# Patient Record
Sex: MALE | Race: BLACK OR AFRICAN AMERICAN | Employment: FULL TIME | URBAN - METROPOLITAN AREA
[De-identification: names, ages, dates, MRNs, and addresses within clinical notes are randomized per-mention and may not be internally consistent; named-entity substitution may affect disease eponyms.]

---

## 2017-07-24 PROBLEM — A63.0 GENITAL WARTS: Status: ACTIVE | Noted: 2017-07-24

## 2018-06-01 ENCOUNTER — OFFICE VISIT (OUTPATIENT)
Dept: FAMILY MEDICINE CLINIC | Age: 33
End: 2018-06-01

## 2018-06-01 ENCOUNTER — HOSPITAL ENCOUNTER (OUTPATIENT)
Dept: LAB | Age: 33
Discharge: HOME | End: 2018-06-01
Payer: COMMERCIAL

## 2018-06-01 VITALS
HEART RATE: 73 BPM | RESPIRATION RATE: 17 BRPM | BODY MASS INDEX: 32.7 KG/M2 | HEIGHT: 75 IN | DIASTOLIC BLOOD PRESSURE: 75 MMHG | TEMPERATURE: 96.9 F | SYSTOLIC BLOOD PRESSURE: 129 MMHG | WEIGHT: 263 LBS

## 2018-06-01 DIAGNOSIS — R74.01 TRANSAMINITIS: ICD-10-CM

## 2018-06-01 DIAGNOSIS — E78.2 MIXED HYPERLIPIDEMIA: ICD-10-CM

## 2018-06-01 DIAGNOSIS — L30.9 DERMATITIS: ICD-10-CM

## 2018-06-01 DIAGNOSIS — R79.89 LOW SERUM TESTOSTERONE LEVEL: ICD-10-CM

## 2018-06-01 DIAGNOSIS — E66.9 OBESITY (BMI 30-39.9): ICD-10-CM

## 2018-06-01 DIAGNOSIS — R74.01 TRANSAMINITIS: Primary | ICD-10-CM

## 2018-06-01 LAB
ALBUMIN SERPL-MCNC: 4.1 G/DL (ref 3.4–5)
ALBUMIN/GLOB SERPL: 1.3 {RATIO} (ref 0.8–1.7)
ALP SERPL-CCNC: 99 U/L (ref 45–117)
ALT SERPL-CCNC: 227 U/L (ref 16–61)
ANION GAP SERPL CALC-SCNC: 9 MMOL/L (ref 3–18)
APPEARANCE UR: CLEAR
AST SERPL-CCNC: 106 U/L (ref 15–37)
BILIRUB SERPL-MCNC: 0.7 MG/DL (ref 0.2–1)
BILIRUB UR QL: NEGATIVE
BUN SERPL-MCNC: 13 MG/DL (ref 7–18)
BUN/CREAT SERPL: 13 (ref 12–20)
CALCIUM SERPL-MCNC: 8.9 MG/DL (ref 8.5–10.1)
CHLORIDE SERPL-SCNC: 109 MMOL/L (ref 100–108)
CHOLEST SERPL-MCNC: 223 MG/DL
CO2 SERPL-SCNC: 25 MMOL/L (ref 21–32)
COLOR UR: NORMAL
CREAT SERPL-MCNC: 1.02 MG/DL (ref 0.6–1.3)
ERYTHROCYTE [DISTWIDTH] IN BLOOD BY AUTOMATED COUNT: 13.8 % (ref 11.6–14.5)
FERRITIN SERPL-MCNC: 416 NG/ML (ref 8–388)
GLOBULIN SER CALC-MCNC: 3.2 G/DL (ref 2–4)
GLUCOSE SERPL-MCNC: 93 MG/DL (ref 74–99)
GLUCOSE UR STRIP.AUTO-MCNC: NEGATIVE MG/DL
HCT VFR BLD AUTO: 48.5 % (ref 36–48)
HDLC SERPL-MCNC: 42 MG/DL (ref 40–60)
HDLC SERPL: 5.3 {RATIO} (ref 0–5)
HGB BLD-MCNC: 16.2 G/DL (ref 13–16)
HGB UR QL STRIP: NEGATIVE
KETONES UR QL STRIP.AUTO: NEGATIVE MG/DL
LDLC SERPL CALC-MCNC: 153.8 MG/DL (ref 0–100)
LEUKOCYTE ESTERASE UR QL STRIP.AUTO: NEGATIVE
LIPID PROFILE,FLP: ABNORMAL
MCH RBC QN AUTO: 30.4 PG (ref 24–34)
MCHC RBC AUTO-ENTMCNC: 33.4 G/DL (ref 31–37)
MCV RBC AUTO: 91 FL (ref 74–97)
NITRITE UR QL STRIP.AUTO: NEGATIVE
PH UR STRIP: 5.5 [PH] (ref 5–8)
PLATELET # BLD AUTO: 178 K/UL (ref 135–420)
PMV BLD AUTO: 12.1 FL (ref 9.2–11.8)
POTASSIUM SERPL-SCNC: 4.3 MMOL/L (ref 3.5–5.5)
PROT SERPL-MCNC: 7.3 G/DL (ref 6.4–8.2)
PROT UR STRIP-MCNC: NEGATIVE MG/DL
RBC # BLD AUTO: 5.33 M/UL (ref 4.7–5.5)
SODIUM SERPL-SCNC: 143 MMOL/L (ref 136–145)
SP GR UR REFRACTOMETRY: 1.03 (ref 1–1.03)
TRIGL SERPL-MCNC: 136 MG/DL (ref ?–150)
UROBILINOGEN UR QL STRIP.AUTO: 1 EU/DL (ref 0.2–1)
VLDLC SERPL CALC-MCNC: 27.2 MG/DL
WBC # BLD AUTO: 6.3 K/UL (ref 4.6–13.2)

## 2018-06-01 PROCEDURE — 86803 HEPATITIS C AB TEST: CPT | Performed by: INTERNAL MEDICINE

## 2018-06-01 PROCEDURE — 82103 ALPHA-1-ANTITRYPSIN TOTAL: CPT | Performed by: INTERNAL MEDICINE

## 2018-06-01 PROCEDURE — 82390 ASSAY OF CERULOPLASMIN: CPT | Performed by: INTERNAL MEDICINE

## 2018-06-01 PROCEDURE — 86038 ANTINUCLEAR ANTIBODIES: CPT | Performed by: INTERNAL MEDICINE

## 2018-06-01 PROCEDURE — 80061 LIPID PANEL: CPT | Performed by: INTERNAL MEDICINE

## 2018-06-01 PROCEDURE — 87340 HEPATITIS B SURFACE AG IA: CPT | Performed by: INTERNAL MEDICINE

## 2018-06-01 PROCEDURE — 85027 COMPLETE CBC AUTOMATED: CPT | Performed by: INTERNAL MEDICINE

## 2018-06-01 PROCEDURE — 84403 ASSAY OF TOTAL TESTOSTERONE: CPT | Performed by: INTERNAL MEDICINE

## 2018-06-01 PROCEDURE — 81003 URINALYSIS AUTO W/O SCOPE: CPT | Performed by: INTERNAL MEDICINE

## 2018-06-01 PROCEDURE — 82728 ASSAY OF FERRITIN: CPT | Performed by: INTERNAL MEDICINE

## 2018-06-01 PROCEDURE — 36415 COLL VENOUS BLD VENIPUNCTURE: CPT | Performed by: INTERNAL MEDICINE

## 2018-06-01 PROCEDURE — 80053 COMPREHEN METABOLIC PANEL: CPT | Performed by: INTERNAL MEDICINE

## 2018-06-01 RX ORDER — TRIAMCINOLONE ACETONIDE 1 MG/G
CREAM TOPICAL 2 TIMES DAILY
Qty: 60 G | Refills: 1 | Status: SHIPPED | OUTPATIENT
Start: 2018-06-01

## 2018-06-01 NOTE — MR AVS SNAPSHOT
Elle Moscoso [place] [address] [facility] [place]. [address] [facility] 83 [id number] 
[phone] Patient: Keely Arita MRN: [id number] :1985 Visit Information Date & Time Provider Department Dept. Phone Encounter #  
 2018  8:30 AM Shama Hahn, [address] [place] [address] [phone] [id number] Follow-up Instructions Return in about 2 weeks (around 6/15/2018) for 30 minute slot if possible. Zhao Linda Upcoming Health Maintenance Date Due Pneumococcal 19-64 Medium Risk (1 of 1 - PPSV23) 2005 DTaP/Tdap/Td series (1 - Tdap) 2007 Influenza Age 5 to Adult 2018 Allergies as of 2018  Review Complete On: 2018 By: Shama Hahn MD  
  
 Severity Noted Reaction Type Reactions Penicillins Low 2014   Not Verified Unknown (comments) Pt states \" not sure has never taken it but was told he was allergic to it\" Current Immunizations  Reviewed on 10/26/2016 No immunizations on file. Not reviewed this visit You Were Diagnosed With   
  
 Codes Comments Transaminitis    -  Primary ICD-10-CM: R74.0 ICD-9-CM: 790.4 Mixed hyperlipidemia     ICD-10-CM: E78.2 ICD-9-CM: 272.2 Low serum testosterone level     ICD-10-CM: R79.89 ICD-9-CM: 790.99 Dermatitis     ICD-10-CM: L30.9 ICD-9-CM: 692.9 Obesity (BMI 30-39. 9)     ICD-10-CM: E66.9 ICD-9-CM: 278.00 Vitals BP Pulse Temp Resp Height(growth percentile) Weight(growth percentile) 129/75 73 96.9 °F (36.1 °C) (Oral) 17 6' 3\" (1.905 m) 263 lb (119.3 kg) BMI Smoking Status 32.87 kg/m2 Current Every Day Smoker Vitals History BMI and BSA Data Body Mass Index Body Surface Area  
 32.87 kg/m 2 2.51 m 2 Preferred Pharmacy Pharmacy Name Phone [facility] [address] [phone] Your Updated Medication List  
  
   
This list is accurate as of 6/1/18  9:10 AM.  Always use your most recent med list.  
  
  
  
  
 triamcinolone acetonide 0.1 % topical cream  
Commonly known as:  KENALOG Apply  to affected area two (2) times a day. use thin layer for 10-14 days. Prescriptions Sent to Pharmacy Refills  
 triamcinolone acetonide (KENALOG) 0.1 % topical cream 1 Sig: Apply  to affected area two (2) times a day. use thin layer for 10-14 days. Class: Normal  
 Pharmacy: [facility] [address], [address] Ph #: [phone] Route: Topical  
  
Follow-up Instructions Return in about 2 weeks (around 6/15/2018) for 30 minute slot if possible. Tory Shell To-Do List   
 06/01/2018 Lab:  ALPHA-1-ANTITRYPSIN, TOTAL   
  
 06/01/2018 Lab:  KATERIN QL, W/REFLEX CASCADE   
  
 06/01/2018 Lab:  CBC W/O DIFF   
  
 06/01/2018 Lab:  CERULOPLASMIN   
  
 06/01/2018 Lab:  FERRITIN   
  
 06/01/2018 Lab:  HEP B SURFACE AG   
  
 06/01/2018 Lab:  HEPATITIS C AB   
  
 06/01/2018 Lab:  LIPID PANEL   
  
 06/01/2018 Lab:  METABOLIC PANEL, COMPREHENSIVE   
  
 06/01/2018 Lab:  TESTOSTERONE, FREE & TOTAL   
  
 06/01/2018 Lab:  URINALYSIS W/ RFLX MICROSCOPIC   
  
 06/01/2018 Imaging:  [facility] Patient Instructions Body Mass Index: Care Instructions Your Care Instructions Body mass index (BMI) can help you see if your weight is raising your risk for health problems. It uses a formula to compare how much you weigh with how tall you are. · A BMI lower than 18.5 is considered underweight. · A BMI between 18.5 and 24.9 is considered healthy. · A BMI between 25 and 29.9 is considered overweight. A BMI of 30 or higher is considered obese.  
If your BMI is in the normal range, it means that you have a lower risk for weight-related health problems. If your BMI is in the overweight or obese range, you may be at increased risk for weight-related health problems, such as high blood pressure, heart disease, stroke, arthritis or joint pain, and diabetes. If your BMI is in the underweight range, you may be at increased risk for health problems such as fatigue, lower protection (immunity) against illness, muscle loss, bone loss, hair loss, and hormone problems. BMI is just one measure of your risk for weight-related health problems. You may be at higher risk for health problems if you are not active, you eat an unhealthy diet, or you drink too much alcohol or use tobacco products. Follow-up care is a key part of your treatment and safety. Be sure to make and go to all appointments, and call your doctor if you are having problems. It's also a good idea to know your test results and keep a list of the medicines you take. How can you care for yourself at home? · Practice healthy eating habits. This includes eating plenty of fruits, vegetables, whole grains, lean protein, and low-fat dairy. · If your doctor recommends it, get more exercise. Walking is a good choice. Bit by bit, increase the amount you walk every day. Try for at least 30 minutes on most days of the week. · Do not smoke. Smoking can increase your risk for health problems. If you need help quitting, talk to your doctor about stop-smoking programs and medicines. These can increase your chances of quitting for good. · Limit alcohol to 2 drinks a day for men and 1 drink a day for women. Too much alcohol can cause health problems. If you have a BMI higher than 25 · Your doctor may do other tests to check your risk for weight-related health problems. This may include measuring the distance around your waist. A waist measurement of more than 40 inches in men or 35 inches in women can increase the risk of weight-related health problems. · Talk with your doctor about steps you can take to stay healthy or improve your health. You may need to make lifestyle changes to lose weight and stay healthy, such as changing your diet and getting regular exercise. If you have a BMI lower than 18.5 · Your doctor may do other tests to check your risk for health problems. · Talk with your doctor about steps you can take to stay healthy or improve your health. You may need to make lifestyle changes to gain or maintain weight and stay healthy, such as getting more healthy foods in your diet and doing exercises to build muscle. Where can you learn more? Go to http://destinee-hyacinth.info/. Enter S176 in the search box to learn more about \"Body Mass Index: Care Instructions. \" Current as of: October 13, 2016 Content Version: 11.4 © 0539-0586 [employer]. Care instructions adapted under license by [employer] (which disclaims liability or warranty for this information). If you have questions about a medical condition or this instruction, always ask your healthcare professional. [address] any warranty or liability for your use of this information. Introducing [facility]! Becky Yoder introduces [employer] patient portal. Now you can access parts of your medical record, email your doctor's office, and request medication refills online. 1. In your internet browser, go to [email or web address] 2. Click on the First Time User? Click Here link in the Sign In box. You will see the New Member Sign Up page. 3. Enter your [employer] Access Code exactly as it appears below. You will not need to use this code after youve completed the sign-up process. If you do not sign up before the expiration date, you must request a new code. · [employer] Access Code: [email or web address] Expires: 8/30/2018  8:38 AM 
 
4.  Enter the last four digits of your Social Security Number (xxxx) and Date of Birth (mm/dd/yyyy) as indicated and click Submit. You will be taken to the next sign-up page. 5. Create a Cloudsnap ID. This will be your Clouds[employer] login ID and cannot be changed, so think of one that is secure and easy to remember. 6. Create a [employer] password. You can change your password at any time. 7. Enter your Password Reset Question and Answer. This can be used at a later time if you forget your password. 8. Enter your e-mail address. You will receive e-mail notification when new information is available in [address] 9. Click Sign Up. You can now view and download portions of your medical record. 10. Click the Download Summary menu link to download a portable copy of your medical information. If you have questions, please visit the Frequently Asked Questions section of the [employer] website. Remember, [employer] is NOT to be used for urgent needs. For medical emergencies, dial 911. Now available from your iPhone and Android! Please provide this summary of care documentation to your next provider. Your primary care clinician is listed as MARION Downs. If you have any questions after today's visit, please call [phone].

## 2018-06-01 NOTE — PROGRESS NOTES
Kymberly Aviles is a 33 y.o.  male and presents with Complete Physical; Rash (itchy rash on his left lower leg); and Request For New Medication (to quit smoking)       Subjective:    Left leg rash - \"long time\"- about a years- itchy- scaling. Low testerone level- would like this rechecked. transaminitis- no IVDA hx. No h/o hep B or C. Drinks heavily about 2 days per week. No jaundice. No abd pain. Hyperlipidemia- reports avoiding fried foods now. Obesity- has lost some weight by watching diet. Diagnoses and all orders for this visit:    1. Transaminitis  -     US ABD LTD; Future  -     CERULOPLASMIN; Future  -     ALPHA-1-ANTITRYPSIN, TOTAL; Future  -     FERRITIN; Future  -     HEP B SURFACE AG; Future  -     HEPATITIS C AB; Future  -     KATERIN QL, W/REFLEX CASCADE; Future  -     CBC W/O DIFF; Future    2. Mixed hyperlipidemia  -     METABOLIC PANEL, COMPREHENSIVE; Future  -     LIPID PANEL; Future  -     URINALYSIS W/ RFLX MICROSCOPIC; Future    3. Low serum testosterone level  -     TESTOSTERONE, FREE & TOTAL; Future    4. Dermatitis  -     METABOLIC PANEL, COMPREHENSIVE; Future  -     CBC W/O DIFF; Future    5. Obesity (BMI 30-39. 9)-encouraged weight loss 1 pound per week to BMI under 25 through caloric restriction and exercise. Other orders  -     triamcinolone acetonide (KENALOG) 0.1 % topical cream; Apply  to affected area two (2) times a day. use thin layer for 10-14 days. ROS:  Constitutional: No recent weight change. No weakness/fatigue. No f/c. Skin: + rash as above, change in nails/hair, itching   HENT: No HA, dizziness. No hearing loss/tinnitus. No nasal congestion/discharge. Eyes: No change in vision, double/blurred vision or eye pain/redness. Cardiovascular: No CP/palpitations. No LANGSTON/orthopnea/PND. Respiratory: No cough/sputum, dyspnea, wheezing. Gastointestinal: No dysphagia, reflux. No n/v. No constipation/diarrhea. No melena/rectal bleeding.    Genitourinary: No dysuria, urinary hesitancy, nocturia, hematuria. No incontinence. Musculoskeletal: No joint pain/stiffness. No muscle pain/tenderness. Endo: No heat/cold intolerance, no polyuria/polydypsia. Heme: No h/o anemia. No easy bleeding/bruising. Allergy/Immunology: No seasonal rhinitis. Denies frequent colds, sinus/ear infections. Neurological: No seizures/numbness/weakness. No paresthesias. Psychiatric:  No depression, anxiety. PMH:  Past Medical History:   Diagnosis Date    Genital warts 7/24/2017       Patient Active Problem List   Diagnosis Code    Genital warts A63.0       PSH:  Past Surgical History:   Procedure Laterality Date    HX UROLOGICAL      wart removal        SH:  Social History   Substance Use Topics    Smoking status: Current Every Day Smoker     Packs/day: 0.50     Types: Cigarettes    Smokeless tobacco: Never Used    Alcohol use Yes      Comment: 3-4 times per week       FH:  History reviewed. No pertinent family history. Medications/Allergies:  No current outpatient prescriptions on file. Allergies   Allergen Reactions    Penicillins Unknown (comments)     Pt states \" not sure has never taken it but was told he was allergic to it\"        Objective:  Visit Vitals    /75    Pulse 73    Temp 96.9 °F (36.1 °C) (Oral)    Resp 17    Ht 6' 3\" (1.905 m)    Wt 263 lb (119.3 kg)    BMI 32.87 kg/m2    Body mass index is 32.87 kg/(m^2). Constitutional: Well developed, nourished, no distress, alert   HENT: Exterior ears and tympanic membranes normal bilaterally. Supple neck. No thyromegaly or lymphadenopathy. Oropharynx clear and moist mucous membranes. Eyes: Conjunctiva normal. PERRL. Cardiovascular: S1, S2.  RRR. No murmurs/rubs. No thrills palpated. No carotid bruits. Intact distal pulses. No edema. Pulmonary/Chest Wall: No abnormalities on inspection. Clear to auscultation bilaterally. No wheezing/rhonchi. Normal effort.      GI: Soft, nontender, nondistended. Normal active bowel sounds. No  masses on palpation. No hepatosplenomegaly. Musculoskeletal: Gait normal.  Joints without deformity/tenderness. Strength intact bilateral upper and lower ext. Normal ROM all extremities. Neurological: Appropriate. 2+DTR. No focal motor or sensory deficits. Speech normal.   Skin: No lesions/rashes on inspection. Psych: Appropriate affect, judgement and insight. Short-term memory intact.          Assessment/Plan:          Health Maintenance:   Health Maintenance   Topic Date Due    Pneumococcal 19-64 Medium Risk (1 of 1 - PPSV23) 11/28/2005    DTaP/Tdap/Td series (1 - Tdap) 11/28/2007    Influenza Age 9 to Adult  08/01/2018       Orders Placed This Encounter    US ABD LTD     Standing Status:   Future     Standing Expiration Date:   7/1/2019     Scheduling Instructions:      [facility]     Order Specific Question:   Specific Body Part     Answer:   right upper quadrant    METABOLIC PANEL, COMPREHENSIVE     Standing Status:   Future     Standing Expiration Date:   6/2/2019    LIPID PANEL     Standing Status:   Future     Standing Expiration Date:   6/2/2019    URINALYSIS W/ RFLX MICROSCOPIC     Standing Status:   Future     Standing Expiration Date:   6/2/2019    TESTOSTERONE, FREE & TOTAL     Standing Status:   Future     Standing Expiration Date:   6/2/2019    CERULOPLASMIN     Standing Status:   Future     Standing Expiration Date:   6/2/2019    ALPHA-1-ANTITRYPSIN, TOTAL     Standing Status:   Future     Standing Expiration Date:   6/2/2019    FERRITIN     Standing Status:   Future     Standing Expiration Date:   6/2/2019    HEP B SURFACE AG     Standing Status:   Future     Standing Expiration Date:   6/2/2019    HEPATITIS C AB     Standing Status:   Future     Standing Expiration Date:   6/2/2019    KATERIN QL, W/REFLEX CASCADE     Standing Status:   Future     Standing Expiration Date:   6/2/2019    CBC W/O DIFF     Standing Status:   Future Standing Expiration Date:   6/2/2019    triamcinolone acetonide (KENALOG) 0.1 % topical cream     Sig: Apply  to affected area two (2) times a day. use thin layer for 10-14 days.      Dispense:  60 g     Refill:  1

## 2018-06-01 NOTE — PATIENT INSTRUCTIONS
Body Mass Index: Care Instructions  Your Care Instructions    Body mass index (BMI) can help you see if your weight is raising your risk for health problems. It uses a formula to compare how much you weigh with how tall you are. · A BMI lower than 18.5 is considered underweight. · A BMI between 18.5 and 24.9 is considered healthy. · A BMI between 25 and 29.9 is considered overweight. A BMI of 30 or higher is considered obese. If your BMI is in the normal range, it means that you have a lower risk for weight-related health problems. If your BMI is in the overweight or obese range, you may be at increased risk for weight-related health problems, such as high blood pressure, heart disease, stroke, arthritis or joint pain, and diabetes. If your BMI is in the underweight range, you may be at increased risk for health problems such as fatigue, lower protection (immunity) against illness, muscle loss, bone loss, hair loss, and hormone problems. BMI is just one measure of your risk for weight-related health problems. You may be at higher risk for health problems if you are not active, you eat an unhealthy diet, or you drink too much alcohol or use tobacco products. Follow-up care is a key part of your treatment and safety. Be sure to make and go to all appointments, and call your doctor if you are having problems. It's also a good idea to know your test results and keep a list of the medicines you take. How can you care for yourself at home? · Practice healthy eating habits. This includes eating plenty of fruits, vegetables, whole grains, lean protein, and low-fat dairy. · If your doctor recommends it, get more exercise. Walking is a good choice. Bit by bit, increase the amount you walk every day. Try for at least 30 minutes on most days of the week. · Do not smoke. Smoking can increase your risk for health problems. If you need help quitting, talk to your doctor about stop-smoking programs and medicines. These can increase your chances of quitting for good. · Limit alcohol to 2 drinks a day for men and 1 drink a day for women. Too much alcohol can cause health problems. If you have a BMI higher than 25  · Your doctor may do other tests to check your risk for weight-related health problems. This may include measuring the distance around your waist. A waist measurement of more than 40 inches in men or 35 inches in women can increase the risk of weight-related health problems. · Talk with your doctor about steps you can take to stay healthy or improve your health. You may need to make lifestyle changes to lose weight and stay healthy, such as changing your diet and getting regular exercise. If you have a BMI lower than 18.5  · Your doctor may do other tests to check your risk for health problems. · Talk with your doctor about steps you can take to stay healthy or improve your health. You may need to make lifestyle changes to gain or maintain weight and stay healthy, such as getting more healthy foods in your diet and doing exercises to build muscle. Where can you learn more? Go to http://destinee-hyacinth.info/. Enter S176 in the search box to learn more about \"Body Mass Index: Care Instructions. \"  Current as of: October 13, 2016  Content Version: 11.4  © 6923-3918 [employer]. Care instructions adapted under license by [employer] (which disclaims liability or warranty for this information). If you have questions about a medical condition or this instruction, always ask your healthcare professional. [address] any warranty or liability for your use of this information.

## 2018-06-01 NOTE — PROGRESS NOTES
1. Have you been to the ER, urgent care clinic since your last visit? Hospitalized since your last visit? No.     2. Have you seen or consulted any other health care providers outside of the [facility] since your last visit? Include any pap smears or colon screening.  No.     Chief Complaint   Patient presents with    Complete Physical    Rash     itchy rash on his left lower leg    Request For New Medication     to quit smoking

## 2018-06-02 LAB
A1AT SERPL-MCNC: 118 MG/DL (ref 90–200)
ANA SER QL: NEGATIVE
CERULOPLASMIN SERPL-MCNC: 19.4 MG/DL (ref 16–31)
SEE BELOW:, 164879: NORMAL

## 2018-06-03 LAB
TESTOST FREE SERPL-MCNC: 9.3 PG/ML (ref 8.7–25.1)
TESTOST SERPL-MCNC: 183 NG/DL (ref 264–916)

## 2018-06-04 LAB
HBV SURFACE AG SER QL: 0.3 INDEX
HBV SURFACE AG SER QL: NEGATIVE
HCV AB SER IA-ACNC: 0.1 INDEX
HCV AB SERPL QL IA: NEGATIVE
HCV COMMENT,HCGAC: NORMAL

## 2018-06-12 ENCOUNTER — HOSPITAL ENCOUNTER (OUTPATIENT)
Dept: ULTRASOUND IMAGING | Age: 33
Discharge: HOME | End: 2018-06-12
Attending: INTERNAL MEDICINE
Payer: COMMERCIAL

## 2018-06-12 DIAGNOSIS — R74.01 TRANSAMINITIS: ICD-10-CM

## 2018-06-12 PROCEDURE — 76705 ECHO EXAM OF ABDOMEN: CPT

## 2018-06-18 ENCOUNTER — OFFICE VISIT (OUTPATIENT)
Dept: FAMILY MEDICINE CLINIC | Age: 33
End: 2018-06-18

## 2018-06-18 VITALS
DIASTOLIC BLOOD PRESSURE: 79 MMHG | SYSTOLIC BLOOD PRESSURE: 117 MMHG | RESPIRATION RATE: 16 BRPM | HEART RATE: 72 BPM | BODY MASS INDEX: 33.2 KG/M2 | TEMPERATURE: 97.6 F | WEIGHT: 267 LBS | HEIGHT: 75 IN

## 2018-06-18 DIAGNOSIS — E78.2 MIXED HYPERLIPIDEMIA: ICD-10-CM

## 2018-06-18 DIAGNOSIS — K75.81 NASH (NONALCOHOLIC STEATOHEPATITIS): ICD-10-CM

## 2018-06-18 DIAGNOSIS — E66.9 OBESITY (BMI 30-39.9): Primary | ICD-10-CM

## 2018-06-18 DIAGNOSIS — D75.1 POLYCYTHEMIA: ICD-10-CM

## 2018-06-18 NOTE — MR AVS SNAPSHOT
Elle Moscoso [place] [address] [facility] [place]. [address] [facility] 83 [id number] 
[phone] Patient: Kaleigh De La Garza MRN: [id number] :1985 Visit Information Date & Time Provider Department Dept. Phone Encounter #  
 2018 10:45 AM Roman Solis, [address] [place] [address] [phone] [id number] Follow-up Instructions Return in about 4 months (around 10/18/2018) for 30 minute slot and labs prior. [place] Upcoming Health Maintenance Date Due Pneumococcal 19-64 Medium Risk (1 of 1 - PPSV23) 2005 DTaP/Tdap/Td series (1 - Tdap) 10/1/2018* Influenza Age 5 to Adult 2018 *Topic was postponed. The date shown is not the original due date. Allergies as of 2018  Review Complete On: 2018 By: Roman Solis MD  
  
 Severity Noted Reaction Type Reactions Penicillins Low 2014   Not Verified Unknown (comments) Pt states \" not sure has never taken it but was told he was allergic to it\" Current Immunizations  Reviewed on 10/26/2016 No immunizations on file. Not reviewed this visit You Were Diagnosed With   
  
 Codes Comments Obesity (BMI 30-39.9)    -  Primary ICD-10-CM: F16.5 ICD-9-CM: 278.00 Mixed hyperlipidemia     ICD-10-CM: E78.2 ICD-9-CM: 272.2 ALARCON (nonalcoholic steatohepatitis)     ICD-10-CM: W92.28 ICD-9-CM: 571.8 by u/s 2018 Polycythemia     ICD-10-CM: D75.1 ICD-9-CM: 238.4 Vitals BP Pulse Temp Resp Height(growth percentile) Weight(growth percentile) 117/79 72 97.6 °F (36.4 °C) (Oral) 16 6' 3\" (1.905 m) 267 lb (121.1 kg) BMI Smoking Status 33.37 kg/m2 Current Every Day Smoker Vitals History BMI and BSA Data Body Mass Index Body Surface Area  
 33.37 kg/m 2 2.53 m 2 Preferred Pharmacy Pharmacy Name Phone  [address] [facility]a Milch AT [facility]  [address] [phone] Your Updated Medication List  
  
   
This list is accurate as of 6/18/18 11:35 AM.  Always use your most recent med list.  
  
  
  
  
 triamcinolone acetonide 0.1 % topical cream  
Commonly known as:  KENALOG Apply  to affected area two (2) times a day. use thin layer for 10-14 days. We Performed the Following REFERRAL TO DIETITIAN [YPD60 Custom] Comments:  
 Please evaluate patient for rutherford, obesity. Follow-up Instructions Return in about 4 months (around 10/18/2018) for 30 minute slot and labs prior. [place] To-Do List   
 06/18/2018 Lab:  FERRITIN   
  
 06/18/2018 Lab:  HGB & HCT   
  
 06/18/2018 Lab:  IRON PROFILE   
  
 06/18/2018 Lab:  METABOLIC PANEL, COMPREHENSIVE Referral Information Referral ID Referred By Referred To  
  
 [id number] MANDY Kline Not Available Visits Status Start Date End Date 1 New Request 6/18/18 6/18/19 If your referral has a status of pending review or denied, additional information will be sent to support the outcome of this decision. Patient Instructions Nonalcoholic Steatohepatitis (RUTHERFORD): Care Instructions Your Care Instructions Nonalcoholic steatohepatitis (RUTHERFORD) is liver inflammation. It is caused by a buildup of fat in the liver. The fat buildup is not caused by drinking alcohol. Because of the inflammation, the liver does not work as well as it should. RUTHERFORD is part of a group of liver diseases called nonalcoholic fatty liver disease. In these diseases, fat builds up in the liver and sometimes causes liver damage. This damage can get worse over time. Follow-up care is a key part of your treatment and safety. Be sure to make and go to all appointments, and call your doctor if you are having problems. It's also a good idea to know your test results and keep a list of the medicines you take. How can you care for yourself at home? · Stay at a healthy weight. Or if you need to, slowly get to a healthy weight. · Control your cholesterol. Talk to your doctor about ways to lower your cholesterol, if needed. You might try getting active, taking medicines, and making healthy changes to your diet. · Eat healthy foods. This includes fruits, vegetables, lean meats and dairy, and whole grains. · If you have diabetes, keep your blood sugar at your target level. · Get at least 30 minutes of exercise on most days of the week. Walking is a good choice. You also may want to do other activities, such as running, swimming, cycling, or playing tennis or team sports. · Limit alcohol, or do not drink. Alcohol can damage the liver and cause health problems. When should you call for help? Call 911 anytime you think you may need emergency care. For example, call if: 
? · You have trouble breathing. ? · You vomit blood or what looks like coffee grounds. ?Call your doctor now or seek immediate medical care if: 
? · You feel very sleepy or confused. ? · You have new or worse belly pain. ? · You have a fever. ? · There is a new or increasing yellow tint to your skin or the whites of your eyes. ? · You have any abnormal bleeding, such as: 
¨ Nosebleeds. ¨ Vaginal bleeding that is different (heavier, more frequent, at a different time of the month) than what you are used to. ¨ Bloody or black stools, or rectal bleeding. ¨ Bloody or pink urine. ? Watch closely for changes in your health, and be sure to contact your doctor if: 
? · Your belly is getting bigger. ? · You are gaining weight. ? · You have any problems. Where can you learn more? Go to http://destinee-hyacinth.info/. Enter G530 in the search box to learn more about \"Nonalcoholic Steatohepatitis (ALARCON): Care Instructions. \" Current as of: May 12, 2017 Content Version: 11.4 © 0294-0847 Healthwise, ServiceFrame. Care instructions adapted under license by [employer] (which disclaims liability or warranty for this information). If you have questions about a medical condition or this instruction, always ask your healthcare professional. [address] any warranty or liability for your use of this information. Body Mass Index: Care Instructions Your Care Instructions Body mass index (BMI) can help you see if your weight is raising your risk for health problems. It uses a formula to compare how much you weigh with how tall you are. · A BMI lower than 18.5 is considered underweight. · A BMI between 18.5 and 24.9 is considered healthy. · A BMI between 25 and 29.9 is considered overweight. A BMI of 30 or higher is considered obese. If your BMI is in the normal range, it means that you have a lower risk for weight-related health problems. If your BMI is in the overweight or obese range, you may be at increased risk for weight-related health problems, such as high blood pressure, heart disease, stroke, arthritis or joint pain, and diabetes. If your BMI is in the underweight range, you may be at increased risk for health problems such as fatigue, lower protection (immunity) against illness, muscle loss, bone loss, hair loss, and hormone problems. BMI is just one measure of your risk for weight-related health problems. You may be at higher risk for health problems if you are not active, you eat an unhealthy diet, or you drink too much alcohol or use tobacco products. Follow-up care is a key part of your treatment and safety. Be sure to make and go to all appointments, and call your doctor if you are having problems. It's also a good idea to know your test results and keep a list of the medicines you take. How can you care for yourself at home? · Practice healthy eating habits.  This includes eating plenty of fruits, vegetables, whole grains, lean protein, and low-fat dairy. · If your doctor recommends it, get more exercise. Walking is a good choice. Bit by bit, increase the amount you walk every day. Try for at least 30 minutes on most days of the week. · Do not smoke. Smoking can increase your risk for health problems. If you need help quitting, talk to your doctor about stop-smoking programs and medicines. These can increase your chances of quitting for good. · Limit alcohol to 2 drinks a day for men and 1 drink a day for women. Too much alcohol can cause health problems. If you have a BMI higher than 25 · Your doctor may do other tests to check your risk for weight-related health problems. This may include measuring the distance around your waist. A waist measurement of more than 40 inches in men or 35 inches in women can increase the risk of weight-related health problems. · Talk with your doctor about steps you can take to stay healthy or improve your health. You may need to make lifestyle changes to lose weight and stay healthy, such as changing your diet and getting regular exercise. If you have a BMI lower than 18.5 · Your doctor may do other tests to check your risk for health problems. · Talk with your doctor about steps you can take to stay healthy or improve your health. You may need to make lifestyle changes to gain or maintain weight and stay healthy, such as getting more healthy foods in your diet and doing exercises to build muscle. Where can you learn more? Go to http://destinee-hyacinth.info/. Enter S176 in the search box to learn more about \"Body Mass Index: Care Instructions. \" Current as of: October 13, 2016 Content Version: 11.4 © 6997-6523 Healthwise, Incorporated. Care instructions adapted under license by [employer] (which disclaims liability or warranty for this information).  If you have questions about a medical condition or this instruction, always ask your healthcare professional. [facility] any warranty or liability for your use of this information. Introducing [facility]! Stefany Villafuerte introduces [employer] patient portal. Now you can access parts of your medical record, email your doctor's office, and request medication refills online. 1. In your internet browser, go to [email or web address] 2. Click on the First Time User? Click Here link in the Sign In box. You will see the New Member Sign Up page. 3. Enter your [employer] Access Code exactly as it appears below. You will not need to use this code after youve completed the sign-up process. If you do not sign up before the expiration date, you must request a new code. · [employer] Access Code: [email or web address] Expires: 8/30/2018  8:38 AM 
 
4. Enter the last four digits of your Social Security Number (xxxx) and Date of Birth (mm/dd/yyyy) as indicated and click Submit. You will be taken to the next sign-up page. 5. Create a [employer] ID. This will be your [employer] login ID and cannot be changed, so think of one that is secure and easy to remember. 6. Create a [employer] password. You can change your password at any time. 7. Enter your Password Reset Question and Answer. This can be used at a later time if you forget your password. 8. Enter your e-mail address. You will receive e-mail notification when new information is available in [address] 9. Click Sign Up. You can now view and download portions of your medical record. 10. Click the Download Summary menu link to download a portable copy of your medical information. If you have questions, please visit the Frequently Asked Questions section of the [employer] website. Remember, [employer] is NOT to be used for urgent needs. For medical emergencies, dial 911. Now available from your iPhone and Android! Please provide this summary of care documentation to your next provider. Your primary care clinician is listed as MARION Downs. If you have any questions after today's visit, please call [phone].

## 2018-06-18 NOTE — PROGRESS NOTES
1. Have you been to the ER, urgent care clinic since your last visit? Hospitalized since your last visit? No.     2. Have you seen or consulted any other health care providers outside of the [address] [place] [address] since your last visit? Include any pap smears or colon screening.  No.     Chief Complaint   Patient presents with    Follow Up Chronic Condition    Gallbladder Attack    Obesity    Other     Transiminitis and Low Testosterone

## 2018-06-18 NOTE — PATIENT INSTRUCTIONS
Nonalcoholic Steatohepatitis (ALARCON): Care Instructions  Your Care Instructions    Nonalcoholic steatohepatitis (ALARCON) is liver inflammation. It is caused by a buildup of fat in the liver. The fat buildup is not caused by drinking alcohol. Because of the inflammation, the liver does not work as well as it should. ALARCON is part of a group of liver diseases called nonalcoholic fatty liver disease. In these diseases, fat builds up in the liver and sometimes causes liver damage. This damage can get worse over time. Follow-up care is a key part of your treatment and safety. Be sure to make and go to all appointments, and call your doctor if you are having problems. It's also a good idea to know your test results and keep a list of the medicines you take. How can you care for yourself at home? · Stay at a healthy weight. Or if you need to, slowly get to a healthy weight. · Control your cholesterol. Talk to your doctor about ways to lower your cholesterol, if needed. You might try getting active, taking medicines, and making healthy changes to your diet. · Eat healthy foods. This includes fruits, vegetables, lean meats and dairy, and whole grains. · If you have diabetes, keep your blood sugar at your target level. · Get at least 30 minutes of exercise on most days of the week. Walking is a good choice. You also may want to do other activities, such as running, swimming, cycling, or playing tennis or team sports. · Limit alcohol, or do not drink. Alcohol can damage the liver and cause health problems. When should you call for help? Call 911 anytime you think you may need emergency care. For example, call if:  ? · You have trouble breathing. ? · You vomit blood or what looks like coffee grounds. ?Call your doctor now or seek immediate medical care if:  ? · You feel very sleepy or confused. ? · You have new or worse belly pain. ? · You have a fever.    ? · There is a new or increasing yellow tint to your skin or the whites of your eyes. ? · You have any abnormal bleeding, such as:  ¨ Nosebleeds. ¨ Vaginal bleeding that is different (heavier, more frequent, at a different time of the month) than what you are used to. ¨ Bloody or black stools, or rectal bleeding. ¨ Bloody or pink urine. ? Watch closely for changes in your health, and be sure to contact your doctor if:  ? · Your belly is getting bigger. ? · You are gaining weight. ? · You have any problems. Where can you learn more? Go to http://destinee-hyacinth.info/. Enter V262 in the search box to learn more about \"Nonalcoholic Steatohepatitis (ALARCON): Care Instructions. \"  Current as of: May 12, 2017  Content Version: 11.4  © 0515-7196 [employer]. Care instructions adapted under license by [employer] (which disclaims liability or warranty for this information). If you have questions about a medical condition or this instruction, always ask your healthcare professional. [place] [address] any warranty or liability for your use of this information. Body Mass Index: Care Instructions  Your Care Instructions    Body mass index (BMI) can help you see if your weight is raising your risk for health problems. It uses a formula to compare how much you weigh with how tall you are. · A BMI lower than 18.5 is considered underweight. · A BMI between 18.5 and 24.9 is considered healthy. · A BMI between 25 and 29.9 is considered overweight. A BMI of 30 or higher is considered obese. If your BMI is in the normal range, it means that you have a lower risk for weight-related health problems. If your BMI is in the overweight or obese range, you may be at increased risk for weight-related health problems, such as high blood pressure, heart disease, stroke, arthritis or joint pain, and diabetes.  If your BMI is in the underweight range, you may be at increased risk for health problems such as fatigue, lower protection (immunity) against illness, muscle loss, bone loss, hair loss, and hormone problems. BMI is just one measure of your risk for weight-related health problems. You may be at higher risk for health problems if you are not active, you eat an unhealthy diet, or you drink too much alcohol or use tobacco products. Follow-up care is a key part of your treatment and safety. Be sure to make and go to all appointments, and call your doctor if you are having problems. It's also a good idea to know your test results and keep a list of the medicines you take. How can you care for yourself at home? · Practice healthy eating habits. This includes eating plenty of fruits, vegetables, whole grains, lean protein, and low-fat dairy. · If your doctor recommends it, get more exercise. Walking is a good choice. Bit by bit, increase the amount you walk every day. Try for at least 30 minutes on most days of the week. · Do not smoke. Smoking can increase your risk for health problems. If you need help quitting, talk to your doctor about stop-smoking programs and medicines. These can increase your chances of quitting for good. · Limit alcohol to 2 drinks a day for men and 1 drink a day for women. Too much alcohol can cause health problems. If you have a BMI higher than 25  · Your doctor may do other tests to check your risk for weight-related health problems. This may include measuring the distance around your waist. A waist measurement of more than 40 inches in men or 35 inches in women can increase the risk of weight-related health problems. · Talk with your doctor about steps you can take to stay healthy or improve your health. You may need to make lifestyle changes to lose weight and stay healthy, such as changing your diet and getting regular exercise. If you have a BMI lower than 18.5  · Your doctor may do other tests to check your risk for health problems.   · Talk with your doctor about steps you can take to stay healthy or improve your health. You may need to make lifestyle changes to gain or maintain weight and stay healthy, such as getting more healthy foods in your diet and doing exercises to build muscle. Where can you learn more? Go to http://destinee-hyacinth.info/. Enter S176 in the search box to learn more about \"Body Mass Index: Care Instructions. \"  Current as of: October 13, 2016  Content Version: 11.4  © 4755-3367 Healthwise, PreViser. Care instructions adapted under license by [employer] (which disclaims liability or warranty for this information). If you have questions about a medical condition or this instruction, always ask your healthcare professional. [address] any warranty or liability for your use of this information.

## 2018-06-18 NOTE — PROGRESS NOTES
Rosangela Rivas is a 33 y.o. male and presents with Follow Up Chronic Condition; Gallbladder Attack; Obesity; and Other (Transiminitis and Low Testosterone)       Subjective:  Hyperlipidemia- reviewed labs with pt. no rashes  Obesity- gained some weight. Tries to watch caloric intake  ALARCON-no abdominal pain or jaundice   Polycythemiadiscussed with patient. He still smokes  Assessment/Plan:    Hyperlipidemia- 10 year risk with artificially increasing age to 37 is only 4.9%- no tx needed- wt loss/exercise/low fat diet encouraged . Obesity- encouraged weight loss to BMI under 25. ALARCON- weight loss importance stressed. Polycythemiawill recheck labs next visit. Strongly recommended stopping smoking  RTC in 3 months    Orders Placed This Encounter    REFERRAL TO DIETITIAN     Referral Priority:   Routine     Referral Type:   Consultation     Referral Reason:   Specialty Services Required     Requested Specialty:   Dietitian     Diagnoses and all orders for this visit:    1. Obesity (BMI 30-39.9)  -     REFERRAL TO DIETITIAN  -     METABOLIC PANEL, COMPREHENSIVE; Future    2. Mixed hyperlipidemia  -     REFERRAL TO DIETITIAN  -     METABOLIC PANEL, COMPREHENSIVE; Future    3. ALARCON (nonalcoholic steatohepatitis)  Comments:  by u/s 6/2018  Orders:  -     METABOLIC PANEL, COMPREHENSIVE; Future    4. Polycythemia  -     IRON PROFILE; Future  -     FERRITIN; Future  -     HGB & HCT; Future    '      ROS:  Negative except as mentioned above  Cardiac- no chest pain or palpitations  Pulmonary- no sob or wheezes  GI- no n/v or diarrhea.       SH:  Social History   Substance Use Topics    Smoking status: Current Every Day Smoker     Packs/day: 0.50     Types: Cigarettes    Smokeless tobacco: Never Used    Alcohol use Yes      Comment: 3-4 times per week         Medications/Allergies:  Current Outpatient Prescriptions on File Prior to Visit   Medication Sig Dispense Refill    triamcinolone acetonide (KENALOG) 0.1 % topical cream Apply  to affected area two (2) times a day. use thin layer for 10-14 days. 60 g 1     No current facility-administered medications on file prior to visit. Allergies   Allergen Reactions    Penicillins Unknown (comments)     Pt states \" not sure has never taken it but was told he was allergic to it\"        Objective:  Visit Vitals    /79    Pulse 72    Temp 97.6 °F (36.4 °C) (Oral)    Resp 16    Ht 6' 3\" (1.905 m)    Wt 267 lb (121.1 kg)    BMI 33.37 kg/m2    Body mass index is 33.37 kg/(m^2). Constitutional: Well developed, nourished, no distress, alert   HENT: Exterior ears and tympanic membranes normal bilaterally. Supple neck. No thyromegaly or lymphadenopathy. Oropharynx clear and moist mucous membranes. Eyes: Conjunctiva normal. PERRL. CV: S1, S2.  RRR. No murmurs/rubs. No thrills palpated. No carotid bruits. Intact distal pulses. No edema. Pulm: No abnormalities on inspection. Clear to auscultation bilaterally. No wheezing/rhonchi. Normal effort. GI: Soft, nontender, nondistended. Normal active bowel sounds. No  masses on palpation. No hepatosplenomegaly.

## 2019-10-15 ENCOUNTER — OFFICE VISIT (OUTPATIENT)
Dept: FAMILY MEDICINE CLINIC | Age: 34
End: 2019-10-15

## 2019-10-15 VITALS
SYSTOLIC BLOOD PRESSURE: 143 MMHG | DIASTOLIC BLOOD PRESSURE: 75 MMHG | WEIGHT: 273.4 LBS | TEMPERATURE: 98 F | OXYGEN SATURATION: 99 % | HEIGHT: 75 IN | RESPIRATION RATE: 17 BRPM | HEART RATE: 67 BPM | BODY MASS INDEX: 33.99 KG/M2

## 2019-10-15 DIAGNOSIS — K75.81 NASH (NONALCOHOLIC STEATOHEPATITIS): ICD-10-CM

## 2019-10-15 DIAGNOSIS — R10.13 DYSPEPSIA: ICD-10-CM

## 2019-10-15 DIAGNOSIS — E78.2 MIXED HYPERLIPIDEMIA: Primary | ICD-10-CM

## 2019-10-15 DIAGNOSIS — R74.01 TRANSAMINITIS: ICD-10-CM

## 2019-10-15 DIAGNOSIS — Z72.0 TOBACCO ABUSE: ICD-10-CM

## 2019-10-15 DIAGNOSIS — D75.1 POLYCYTHEMIA: ICD-10-CM

## 2019-10-15 DIAGNOSIS — E66.9 OBESITY (BMI 30-39.9): ICD-10-CM

## 2019-10-15 RX ORDER — PHENOL/SODIUM PHENOLATE
20 AEROSOL, SPRAY (ML) MUCOUS MEMBRANE DAILY
Qty: 30 TAB | Refills: 1 | Status: SHIPPED | OUTPATIENT
Start: 2019-10-15

## 2019-10-15 NOTE — PROGRESS NOTES
1. Have you been to the ER, urgent care clinic since your last visit? Hospitalized since your last visit? No.     2. Have you seen or consulted any other health care providers outside of the [address] [place] [address] since your last visit? Include any pap smears or colon screening. No.     Chief Complaint   Patient presents with    Rib Pain     left side, Normally feels the pain after eating. Comes and goes for the last month.

## 2019-10-15 NOTE — PATIENT INSTRUCTIONS
Indigestion (Dyspepsia or Heartburn): Care Instructions Your Care Instructions Sometimes it can be hard to pinpoint the cause of indigestion. (It is also called dyspepsia or heartburn.) Most cases of an upset stomach with bloating, burning, burping, and nausea are minor and go away within several hours. Home treatment and over-the-counter medicine often are able to control symptoms. But if you take medicine to relieve your indigestion without making diet and lifestyle changes, your symptoms are likely to return again and again. If you get indigestion often, it may be a sign of a more serious medical problem. Be sure to follow up with your doctor, who may want to do tests to be sure of the cause of your indigestion. Follow-up care is a key part of your treatment and safety. Be sure to make and go to all appointments, and call your doctor if you are having problems. It's also a good idea to know your test results and keep a list of the medicines you take. How can you care for yourself at home? · Your doctor may recommend over-the-counter medicine. For mild or occasional indigestion, antacids such as Gaviscon, Mylanta, Maalox, or Tums, may help. Be safe with medicines. Be careful when you take over-the-counter antacid medicines. Many of these medicines have aspirin in them. Read the label to make sure that you are not taking more than the recommended dose. Too much aspirin can be harmful. · Your doctor also may recommend over-the-counter acid reducers, such as Pepcid AC, Tagamet HB, Zantac 75, or Prilosec. Read and follow all instructions on the label. If you use these medicines often, talk with your doctor. · Change your eating habits. ? It's best to eat several small meals instead of two or three large meals. ? After you eat, wait 2 to 3 hours before you lie down. ? Chocolate, mint, and alcohol can make GERD worse. ?  Spicy foods, foods that have a lot of acid (like tomatoes and oranges), and coffee can make GERD symptoms worse in some people. If your symptoms are worse after you eat a certain food, you may want to stop eating that food to see if your symptoms get better. · Do not smoke or chew tobacco. Smoking can make GERD worse. If you need help quitting, talk to your doctor about stop-smoking programs and medicines. These can increase your chances of quitting for good. · If you have GERD symptoms at night, raise the head of your bed 6 to 8 inches. You can do this by putting the frame on blocks or placing a foam wedge under the head of your mattress. (Adding extra pillows does not work.) · Do not wear tight clothing around your middle. · Lose weight if you need to. Losing just 5 to 10 pounds can help. · Do not take anti-inflammatory medicines, such as aspirin, ibuprofen (Advil, Motrin), or naproxen (Aleve). These can irritate the stomach. If you need a pain medicine, try acetaminophen (Tylenol), which does not cause stomach upset. When should you call for help? Call your doctor now or seek immediate medical care if: 
  · You have new or worse belly pain.  
  · You are vomiting.  
 Watch closely for changes in your health, and be sure to contact your doctor if: 
  · You have new or worse symptoms of indigestion.  
  · You have trouble or pain swallowing.  
  · You are losing weight.  
  · You do not get better as expected. Where can you learn more? Go to http://destinee-hyacinth.info/. Enter V549 in the search box to learn more about \"Indigestion (Dyspepsia or Heartburn): Care Instructions. \" Current as of: November 7, 2018 Content Version: 12.2 © 7376-9001 [employer]. Care instructions adapted under license by [employer] (which disclaims liability or warranty for this information).  If you have questions about a medical condition or this instruction, always ask your healthcare professional. [employer] disclaims any warranty or liability for your use of this information.

## 2019-10-15 NOTE — PROGRESS NOTES
Angie Delgado is a 29 y.o. male and presents with Rib Pain (left side, Normally feels the pain after eating. Comes and goes for the last month. )       Subjective:    abd pain LUQ for several week after eating and intermittently. Pain lasts for minutes. No nausea vomiting. No fevers or chills. No melena or hematochezia. Bowels are slightly looser than usual he reports. Diet has improved he reports and he is eating more salads. Missed follow up one year agodiscussed importance of this with patient  Transaminitison previous labsdue to [place] most likely. Importance of weight loss and monitoring this reviewed. Hyperlipidemia diet improved but will need recheck of this. Obesitygained weight from last visit  Tobaccostill smokingexpresses some interest in stopping    Assessment/Plan:        Diagnoses and all orders for this visit:    1. Mixed hyperlipidemia-recheck labs  -     LIPID PANEL; Future  -     TSH 3RD GENERATION; Future    2. ALARCON (nonalcoholic steatohepatitis)  -     METABOLIC PANEL, COMPREHENSIVE; Future  -     TSH 3RD GENERATION; Future    3. Transaminitis-need recheck labs    4. Obesity (BMI 30-39. 9)-weight loss at 1 pound per week stressed with patient. Caloric restriction to this goal reviewed  -     HEMOGLOBIN A1C WITH EAG; Future    5. Polycythemia    6. Tobacco abuse-had some polycythemia on most likely doing thiswe will recheck CBC and explore possible prescription. Importance of follow-up stressed  -     CBC W/O DIFF; Future    7. Dyspepsia-trial proton pump inhibitor and monitor for improvement. Patient will return if symptoms not improving or worsen    Other orders  -     Omeprazole delayed release (PRILOSEC D/R) 20 mg tablet; Take 1 Tab by mouth daily. Take 30-60 minutes prior to meal.        RTC in 2 months with labs.    Orders Placed This Encounter    METABOLIC PANEL, COMPREHENSIVE     Standing Status:   Future     Standing Expiration Date:   10/15/2020    LIPID PANEL Standing Status:   Future     Standing Expiration Date:   10/15/2020    HEMOGLOBIN A1C WITH EAG     Standing Status:   Future     Standing Expiration Date:   10/15/2020    TSH 3RD GENERATION     Standing Status:   Future     Standing Expiration Date:   10/15/2020    CBC W/O DIFF     Standing Status:   Future     Standing Expiration Date:   10/15/2020    Omeprazole delayed release (PRILOSEC D/R) 20 mg tablet     Sig: Take 1 Tab by mouth daily. Take 30-60 minutes prior to meal.     Dispense:  30 Tab     Refill:  1               ROS:  Negative except as mentioned above  Cardiac- no chest pain or palpitations  Pulmonary- no sob or wheezes  GI- no n/v or diarrhea. SH:  Social History     Tobacco Use    Smoking status: Current Every Day Smoker     Packs/day: 0.50     Types: Cigarettes    Smokeless tobacco: Never Used   Substance Use Topics    Alcohol use: Yes     Comment: 3-4 times per week    Drug use: Yes     Types: Marijuana         Medications/Allergies:  Current Outpatient Medications on File Prior to Visit   Medication Sig Dispense Refill    triamcinolone acetonide (KENALOG) 0.1 % topical cream Apply  to affected area two (2) times a day. use thin layer for 10-14 days. 60 g 1     No current facility-administered medications on file prior to visit. Allergies   Allergen Reactions    Penicillins Unknown (comments)     Pt states \" not sure has never taken it but was told he was allergic to it\"        Objective:  Visit Vitals  /75   Pulse 67   Temp 98 °F (36.7 °C) (Oral)   Resp 17   Ht 6' 3\" (1.905 m)   Wt 273 lb 6.4 oz (124 kg)   SpO2 99%   BMI 34.17 kg/m²    Body mass index is 34.17 kg/m². Constitutional: Well developed, nourished, no distress, alert, obese   Eyes: Conjunctiva normal.    CV: S1, S2.  RRR. No murmurs/rubs. No edema. Pulm: No abnormalities on inspection. Clear to auscultation bilaterally. No wheezing/rhonchi. Normal effort. GI: Soft, nontender, nondistended. Normal active bowel sounds. No  masses on palpation. No hepatosplenomegaly.